# Patient Record
Sex: MALE | Race: WHITE | NOT HISPANIC OR LATINO | URBAN - METROPOLITAN AREA
[De-identification: names, ages, dates, MRNs, and addresses within clinical notes are randomized per-mention and may not be internally consistent; named-entity substitution may affect disease eponyms.]

---

## 2017-07-21 ENCOUNTER — EMERGENCY (EMERGENCY)
Facility: HOSPITAL | Age: 37
LOS: 1 days | Discharge: HOME | End: 2017-07-21
Admitting: EMERGENCY MEDICINE
Payer: COMMERCIAL

## 2017-07-21 VITALS
HEART RATE: 68 BPM | OXYGEN SATURATION: 97 % | SYSTOLIC BLOOD PRESSURE: 127 MMHG | RESPIRATION RATE: 16 BRPM | DIASTOLIC BLOOD PRESSURE: 74 MMHG | TEMPERATURE: 98 F

## 2017-07-21 VITALS — WEIGHT: 164.91 LBS

## 2017-07-21 DIAGNOSIS — M79.675 PAIN IN LEFT TOE(S): ICD-10-CM

## 2017-07-21 DIAGNOSIS — M10.9 GOUT, UNSPECIFIED: ICD-10-CM

## 2017-07-21 PROCEDURE — 73630 X-RAY EXAM OF FOOT: CPT | Mod: 26,LT

## 2017-07-21 PROCEDURE — 99284 EMERGENCY DEPT VISIT MOD MDM: CPT | Mod: 25

## 2017-07-21 RX ORDER — INDOMETHACIN 50 MG
1 CAPSULE ORAL
Qty: 20 | Refills: 0 | OUTPATIENT
Start: 2017-07-21

## 2017-07-21 RX ORDER — COLCHICINE 0.6 MG
1 TABLET ORAL
Qty: 3 | Refills: 0 | OUTPATIENT
Start: 2017-07-21 | End: 2017-07-22

## 2017-07-21 RX ORDER — COLCHICINE 0.6 MG
1.2 TABLET ORAL ONCE
Qty: 0 | Refills: 0 | Status: COMPLETED | OUTPATIENT
Start: 2017-07-21 | End: 2017-07-21

## 2017-07-21 RX ORDER — KETOROLAC TROMETHAMINE 30 MG/ML
60 SYRINGE (ML) INJECTION ONCE
Qty: 0 | Refills: 0 | Status: DISCONTINUED | OUTPATIENT
Start: 2017-07-21 | End: 2017-07-21

## 2017-07-21 RX ADMIN — Medication 60 MILLIGRAM(S): at 12:04

## 2017-07-21 RX ADMIN — Medication 1.2 MILLIGRAM(S): at 12:04

## 2017-07-21 NOTE — ED PROVIDER NOTE - OBJECTIVE STATEMENT
36 y/o Male with no PMHx presents to the ED c/o left foot pain. Pt states in the last few days he has had sudden left great toe pain. Does not recall any traumatic events, falls, insect bites, or doing anything out of ordinary. Reports some numbness and tingling and pain is constant but worse with movement and walking. Today redness and swelling developed to the left great toe, took Advil and provided minimal relief. However, yesterday Advil provided relief. Reports FHx of gout.  Denies HA, dizziness, photophobia, diplopia, change in vision/hearing/gait/mental status/speech, focal weakness, neck pain, rash, fever, chills, stiffness, CP, SOB, palpitations, diaphoresis, N/V/D/C, abdominal pain, change in urinary/bowel function, dysuria, hematuria, flank pain, and malaise. 36 y/o Male with no PMHx presents to the ED c/o atraumatic left foot pain. Pt states in the last few days he has had sudden left great toe pain. Does not recall any traumatic events, falls, insect bites, or doing anything out of ordinary. Reports some numbness and tingling and pain is constant but worse with movement and walking. Today redness and swelling developed to the left great toe, took Advil and provided minimal relief. However, yesterday Advil provided relief. Reports FHx of gout. Denies fever, chills, trauma, break in the skin, paresthesia, bleeding, d/c, HA, dizziness, SOB, CP, palpitations, N/V, calf pain, focal weakness, and malaise.

## 2017-07-21 NOTE — SBIRT NOTE. - NSSBIRTSERVICES_GEN_A_ED_FT
Provided SBIRT services : Full screen positive. Positive reinforcement provided given patient currently within healthy guidelines. Educational materials reviewed and given to patient .     Audit Score :5    Dast Score :    Duration : 5 Minutes

## 2017-07-21 NOTE — ED PROVIDER NOTE - MUSCULOSKELETAL, MLM
Spine appears normal, range of motion is not limited, no muscle or joint tenderness ambulatory with mild limp, spine wnl, no step off or mid line tenderness

## 2017-07-21 NOTE — ED PROVIDER NOTE - SKIN, MLM
Left great toe with minimal edema and erythema to the lateral aspect of the toe with warmth and exquisite tenderness to light touch.

## 2017-07-21 NOTE — ED PROVIDER NOTE - DIAGNOSTIC INTERPRETATION
Xray (wet reads) interpreted by ASHELY CEDEÑO   Xray foot - +soft tissue swelling. no acute fx or dislocation, joint space intact, no effusion noted

## 2017-07-21 NOTE — ED PROVIDER NOTE - MEDICAL DECISION MAKING DETAILS
AFVSS at time of d/c, pt non-toxic appearing and hemodynamically stable, results, ddx, and f/u plans discussed with pt at bedside, d/c'd home to f/u with PMD, strict return precautions discussed, prompt return to ER for any worsening or new sx, pt verbalized understanding. pt with atraumatic isolated L great toe pain, site slightly warm and erythematous, exquisitely tender to touch, xray negative for bony pathology, sx suggestive of gouty arthritis, s/p toradol, colchicine with slight improvement, AFVSS at time of d/c, pt non-toxic appearing and hemodynamically stable, results, ddx, and f/u plans discussed with pt at bedside, d/c'd home to f/u with PMD and rheumatology, strict return precautions discussed, prompt return to ER for any worsening or new sx, pt verbalized understanding.

## 2020-11-12 ENCOUNTER — EMERGENCY (EMERGENCY)
Facility: HOSPITAL | Age: 40
LOS: 1 days | Discharge: HOME | End: 2020-11-12
Attending: EMERGENCY MEDICINE | Admitting: EMERGENCY MEDICINE
Payer: COMMERCIAL

## 2020-11-12 VITALS
RESPIRATION RATE: 15 BRPM | WEIGHT: 154.32 LBS | DIASTOLIC BLOOD PRESSURE: 72 MMHG | HEIGHT: 69 IN | OXYGEN SATURATION: 97 % | TEMPERATURE: 99 F | HEART RATE: 80 BPM | SYSTOLIC BLOOD PRESSURE: 130 MMHG

## 2020-11-12 DIAGNOSIS — Z20.828 CONTACT WITH AND (SUSPECTED) EXPOSURE TO OTHER VIRAL COMMUNICABLE DISEASES: ICD-10-CM

## 2020-11-12 PROCEDURE — 99283 EMERGENCY DEPT VISIT LOW MDM: CPT

## 2020-11-12 NOTE — ED PROVIDER NOTE - OBJECTIVE STATEMENT
Pt is a 41yo M who presents requesting a covid test.  Denies any symptoms.  Denies CP, SOB, N/V/D, sore throat, cough, loss of smell or taste, or any toher concerns.

## 2020-11-12 NOTE — ED PROVIDER NOTE - NSFOLLOWUPINSTRUCTIONS_ED_ALL_ED_FT
IF YOU DO NOT GET YOUR RESULTS WITHIN 48 HOURS PLEASE CALL [phone].    IF YOUR RESULT COMES BACK POSITIVE:    1. STAY HOME for 14 DAYS  2. Minimize Human contact to ONLY ESSENTIAL  3. Every time you wash your hands, sing the HAPPY BIRTHDAY Song so you know you're washing long enough.  Make sure to scrub the webspace between your fingers.  4. DRINK 1-3 Liters of fluids day x at least 5 days.  To remain hydrated. Your fatigue, lightheadedness, and body aches will decrease and your fever has a better chance of breaking if you are well hydrated.    5. For your Fever and Body aches takes Tylenol 650-100mg every 4-6h (max 4000mg/day). Try not to use ibuprofen, aspirin or naproxen (Advil, Motrin or Aleve) as these may worsen Coronavirus infection.  6. RETURN TO THE ER IMMEDIATELY IF YOU HAVE WORSENING SHORTNESS OF BREATH. SYMPTOMS USUALLY PEAK BETWEEN DAY 7-10.

## 2020-11-12 NOTE — ED PROVIDER NOTE - PATIENT PORTAL LINK FT
You can access the FollowMyHealth Patient Portal offered by [facility] by registering at the following website: [email or web address]. By joining [employer]’s FollowMyHealth portal, you will also be able to view your health information using other applications (apps) compatible with our system.

## 2020-11-30 ENCOUNTER — EMERGENCY (EMERGENCY)
Facility: HOSPITAL | Age: 40
LOS: 1 days | Discharge: HOME | End: 2020-11-30
Admitting: EMERGENCY MEDICINE
Payer: COMMERCIAL

## 2020-11-30 VITALS
SYSTOLIC BLOOD PRESSURE: 117 MMHG | RESPIRATION RATE: 16 BRPM | TEMPERATURE: 97 F | OXYGEN SATURATION: 100 % | DIASTOLIC BLOOD PRESSURE: 63 MMHG | HEIGHT: 69 IN | HEART RATE: 74 BPM

## 2020-11-30 PROCEDURE — 99283 EMERGENCY DEPT VISIT LOW MDM: CPT

## 2020-11-30 NOTE — ED PROVIDER NOTE - PATIENT PORTAL LINK FT
You can access the FollowMyHealth Patient Portal offered by [facility] by registering at the following website: [email or web address]. By joining [employer]’s FollowMyHealth portal, you will also be able to view your health information using other applications (apps) compatible with our system.

## 2020-11-30 NOTE — ED PROVIDER NOTE - CLINICAL SUMMARY MEDICAL DECISION MAKING FREE TEXT BOX
Pt presents for screening for covid. Pt is asymptomatic. Swab sent. Consents to E results. Will d/c.

## 2020-11-30 NOTE — ED PROVIDER NOTE - OBJECTIVE STATEMENT
presents today requesting covid screening. denies any symptoms including fever, chills, n/v/d, abd pain, cp, sob, sore throat, any other concerns.  Pt was exposed to positive COVID and traveled to [place] in the last week.

## 2020-11-30 NOTE — ED PROVIDER NOTE - TEMPLATE, MLM
The patient will follow up with me after the Christmas holiday.  We will schedule her for a repeat surveillance pancreas protocol CT scan in 3 months as well.  She will also follow-up with her medical oncologist to see if she needs any adjuvant therapy.  
General

## 2020-12-01 LAB — SARS-COV-2 RNA SPEC QL NAA+PROBE: SIGNIFICANT CHANGE UP

## 2020-12-04 DIAGNOSIS — Z20.828 CONTACT WITH AND (SUSPECTED) EXPOSURE TO OTHER VIRAL COMMUNICABLE DISEASES: ICD-10-CM

## 2021-01-07 ENCOUNTER — EMERGENCY (EMERGENCY)
Facility: HOSPITAL | Age: 41
LOS: 1 days | Discharge: HOME | End: 2021-01-07
Attending: EMERGENCY MEDICINE | Admitting: EMERGENCY MEDICINE
Payer: COMMERCIAL

## 2021-01-07 VITALS
TEMPERATURE: 98 F | OXYGEN SATURATION: 98 % | HEART RATE: 64 BPM | DIASTOLIC BLOOD PRESSURE: 71 MMHG | SYSTOLIC BLOOD PRESSURE: 113 MMHG | RESPIRATION RATE: 18 BRPM | HEIGHT: 69 IN

## 2021-01-07 DIAGNOSIS — Z20.822 CONTACT WITH AND (SUSPECTED) EXPOSURE TO COVID-19: ICD-10-CM

## 2021-01-07 PROCEDURE — 99283 EMERGENCY DEPT VISIT LOW MDM: CPT

## 2021-01-07 NOTE — ED PROVIDER NOTE - PATIENT PORTAL LINK FT
You can access the FollowMyHealth Patient Portal offered by [facility] by registering at the following website: [email or web address]. By joining [employer]’s FollowMyHealth portal, you will also be able to view your health information using other applications (apps) compatible with our system.

## 2021-01-07 NOTE — ED PROVIDER NOTE - OBJECTIVE STATEMENT
Patient presented requesting covid-19 testing. Patient asymptomatic - denies chest pain, dyspnea, fever, cough. Patient presented requesting covid-19 testing. Patient asymptomatic - denies chest pain, dyspnea, fever, cough.  Recent travel to [place], returned 4 days ago.

## 2021-01-07 NOTE — ED PROVIDER NOTE - NSFOLLOWUPINSTRUCTIONS_ED_ALL_ED_FT
Your test results may take 1-3 days. You will get a text/email.  Please check the patient online portal ([facility] and website) for results. You can create a portal account at [email or web address]. [facility] [place]. If you have old records with [employer] or [employer] [facility]  or encounter any difficulties with us you will need to call the HELP line to merge results [phone] (Mon-Fri 8a-5p).    Please follow the instructions on provided coronavirus discharge educational forms and if needed self quarantine for 14 days.     If you test positive for COVID 19:    1. STAY HOME for 14 DAYS  2. Minimize Human contact to ONLY ESSENTIAL  3. Every time you wash your hands, sing the HAPPY BIRTHDAY Song so you know you're washing long enough.  Make sure to scrub the webspace between your fingers.  4. DRINK 1-3 Liters of fluids day x at least 5 days.  To remain hydrated. Your fatigue, lightheadedness, and body aches will decrease and your fever has a better chance of breaking if you are well hydrated.    5. For your Fever and Body aches takes Tylenol 650-100mg every 4-6h (max 4000mg/day). Try not to use ibuprofen, aspirin or naproxen (Advil, Motrin or Aleve) as these may worsen Coronavirus infection.  6. Use an inhaler for mild shortness of breath and cough  7. RETURN TO THE ER IMMEDIATELY IF YOU HAVE WORSENING SHORTNESS OF BREATH  8. TAKE THE FOLLOWING SUPPLEMENTS DAILY.        VITAMIN C 1000MG ONCE DAILY.        VITAMIN D 200IU ONCE DAILY.        ZINC 50MG ONCE DAILY.

## 2021-08-20 NOTE — ED POST DISCHARGE NOTE - ADDITIONAL DOCUMENTATION
Spoke with patient after he confirmed his full name, birthday, date seen and address and he was provided with the report for his xrays.
No

## 2024-07-11 NOTE — ED ADULT TRIAGE NOTE - NS ED TRIAGE HISTORIAN
Edwige Carlos RN made aware that pts 3rd manual blood pressure result is 154/88 and that pt did forget to take his bp meds today. Edwige also made aware that pts urine protein has increased since previous labs. Per Edwige, per Dr. Chun, pt is ok to receive treatment today as ordered.    Patient